# Patient Record
Sex: MALE | HISPANIC OR LATINO | URBAN - METROPOLITAN AREA
[De-identification: names, ages, dates, MRNs, and addresses within clinical notes are randomized per-mention and may not be internally consistent; named-entity substitution may affect disease eponyms.]

---

## 2024-07-08 ENCOUNTER — APPOINTMENT (RX ONLY)
Dept: URBAN - METROPOLITAN AREA CLINIC 34 | Facility: CLINIC | Age: 59
Setting detail: DERMATOLOGY
End: 2024-07-08

## 2024-07-08 DIAGNOSIS — L91.8 OTHER HYPERTROPHIC DISORDERS OF THE SKIN: ICD-10-CM

## 2024-07-08 DIAGNOSIS — L73.8 OTHER SPECIFIED FOLLICULAR DISORDERS: ICD-10-CM

## 2024-07-08 DIAGNOSIS — Z41.9 ENCOUNTER FOR PROCEDURE FOR PURPOSES OTHER THAN REMEDYING HEALTH STATE, UNSPECIFIED: ICD-10-CM

## 2024-07-08 PROCEDURE — ? BENIGN DESTRUCTION COSMETIC MULTI

## 2024-07-08 PROCEDURE — ? PHOTO-DOCUMENTATION

## 2024-07-08 PROCEDURE — ? PRESCRIPTION MEDICATION MANAGEMENT

## 2024-07-08 PROCEDURE — ? BOTOX

## 2024-07-08 PROCEDURE — 99202 OFFICE O/P NEW SF 15 MIN: CPT

## 2024-07-08 PROCEDURE — ? PRESCRIPTION

## 2024-07-08 PROCEDURE — ? COSMETIC CONSULTATION: BOTOX

## 2024-07-08 PROCEDURE — ? COUNSELING

## 2024-07-08 RX ORDER — TRETIONIN 1 MG/G
CREAM TOPICAL
Qty: 20 | Refills: 2 | Status: ERX | COMMUNITY
Start: 2024-07-08

## 2024-07-08 RX ADMIN — TRETIONIN: 1 CREAM TOPICAL at 00:00

## 2024-07-08 ASSESSMENT — LOCATION SIMPLE DESCRIPTION DERM
LOCATION SIMPLE: RIGHT CHEEK
LOCATION SIMPLE: RIGHT ANTERIOR NECK
LOCATION SIMPLE: GLABELLA
LOCATION SIMPLE: LEFT CHEEK
LOCATION SIMPLE: LEFT CHEEK
LOCATION SIMPLE: GLABELLA
LOCATION SIMPLE: RIGHT CHEEK
LOCATION SIMPLE: LEFT ANTERIOR NECK

## 2024-07-08 ASSESSMENT — LOCATION DETAILED DESCRIPTION DERM
LOCATION DETAILED: RIGHT INFERIOR LATERAL NECK
LOCATION DETAILED: LEFT CLAVICULAR NECK
LOCATION DETAILED: RIGHT MEDIAL MALAR CHEEK
LOCATION DETAILED: GLABELLA
LOCATION DETAILED: LEFT CENTRAL MALAR CHEEK
LOCATION DETAILED: RIGHT SUPERIOR ANTERIOR NECK
LOCATION DETAILED: GLABELLA
LOCATION DETAILED: LEFT CENTRAL MALAR CHEEK
LOCATION DETAILED: RIGHT MEDIAL MALAR CHEEK

## 2024-07-08 ASSESSMENT — LOCATION ZONE DERM
LOCATION ZONE: NECK
LOCATION ZONE: FACE
LOCATION ZONE: FACE

## 2024-07-08 NOTE — PROCEDURE: COUNSELING
Detail Level: Detailed
Patient Specific Counseling (Will Not Stick From Patient To Patient): -\\Daniela recommended the patient getting oil free face wash and cleanser.

## 2024-07-08 NOTE — PROCEDURE: PRESCRIPTION MEDICATION MANAGEMENT
Render In Strict Bullet Format?: No
Initiate Treatment: tretinoin 0.1 % topical cream \\nApply to affected areas on the face.
Detail Level: Zone

## 2024-07-08 NOTE — HPI: SKIN LESION (IRRITATED SKIN TAGS)
How Severe Are They?: moderate
Is This A New Presentation, Or A Follow-Up?: Irritated Skin Lesions
Additional History: Pt presents with skin tags on his neck and on his abdomen. He mentions they get caught in his necklace and on his clothes. Due to being bothersome and irritated, he requests them to be removed today.

## 2024-07-08 NOTE — PROCEDURE: BENIGN DESTRUCTION COSMETIC MULTI
Consent: The patient's consent was obtained including but not limited to risks of crusting, scabbing, blistering, scarring, darker or lighter pigmentary change, recurrence, incomplete removal and infection.
Anesthesia Volume In Cc: 0.5
Post-Care Instructions: I reviewed with the patient in detail post-care instructions. Patient is to wear sunprotection, and avoid picking at any of the treated lesions. Pt may apply Vaseline to crusted or scabbing areas.
Price (Use Numbers Only, No Special Characters Or $): 839
Total Number Of Lesions Treated: 5
Detail Level: Zone

## 2024-07-08 NOTE — PROCEDURE: BOTOX
Consent: Written consent obtained. Risks include but not limited to lid/brow ptosis, bruising, swelling, diplopia, temporary effect, incomplete chemical denervation.
Nasal Root Units: 0
Show Lateral Platysmal Band Units: Yes
Post-Care Instructions: Patient instructed to not lie down for 4 hours and limit physical activity for 24 hours.
Show Right And Left Periorbital Units: No
Dilution (U/0.1 Cc): 4
Additional Area 1 Location: glabella
Additional Area 1 Units: 20
Incrementing Botox Units: By 0.5 Units
Lot #: [id number]
Expiration Date (Month Year): 2026/03
Detail Level: Detailed
Glabellar Complex Units: 0.5
Price (Use Numbers Only, No Special Characters Or $): 320
Map Statement: Please see attached map for locations and injection amounts.

## 2024-07-08 NOTE — HPI: BUMPS
How Severe Are Your Bumps?: moderate
Is This A New Presentation, Or A Follow-Up?: Bumps
Additional History: Pt presents with bumps on his face. He denies any itchiness, but they are spreading across his face. Pt has used tretinoin 0.025% from his brother for a few weeks and noticed no changes. He also wants to discuss a skin care routine to make his skin look more clear.

## 2024-07-19 RX ORDER — TRETIONIN 1 MG/G
CREAM TOPICAL
Qty: 20 | Refills: 2 | Status: ERX

## 2024-08-09 ENCOUNTER — APPOINTMENT (RX ONLY)
Dept: URBAN - METROPOLITAN AREA CLINIC 34 | Facility: CLINIC | Age: 59
Setting detail: DERMATOLOGY
End: 2024-08-09

## 2024-08-09 DIAGNOSIS — L82.0 INFLAMED SEBORRHEIC KERATOSIS: ICD-10-CM | Status: RESOLVED

## 2024-08-09 DIAGNOSIS — L73.8 OTHER SPECIFIED FOLLICULAR DISORDERS: ICD-10-CM | Status: INADEQUATELY CONTROLLED

## 2024-08-09 PROCEDURE — ? PRESCRIPTION MEDICATION MANAGEMENT

## 2024-08-09 PROCEDURE — ? PHOTO-DOCUMENTATION

## 2024-08-09 PROCEDURE — ? COUNSELING

## 2024-08-09 PROCEDURE — 17110 DESTRUCTION B9 LES UP TO 14: CPT

## 2024-08-09 PROCEDURE — 99212 OFFICE O/P EST SF 10 MIN: CPT | Mod: 25

## 2024-08-09 PROCEDURE — ? LIQUID NITROGEN

## 2024-08-09 ASSESSMENT — LOCATION ZONE DERM
LOCATION ZONE: FACE
LOCATION ZONE: FACE

## 2024-08-09 ASSESSMENT — LOCATION DETAILED DESCRIPTION DERM
LOCATION DETAILED: RIGHT CENTRAL MALAR CHEEK
LOCATION DETAILED: RIGHT MEDIAL MALAR CHEEK
LOCATION DETAILED: LEFT SUPERIOR FOREHEAD
LOCATION DETAILED: LEFT CENTRAL MALAR CHEEK
LOCATION DETAILED: LEFT CENTRAL MALAR CHEEK
LOCATION DETAILED: RIGHT MEDIAL MALAR CHEEK

## 2024-08-09 ASSESSMENT — LOCATION SIMPLE DESCRIPTION DERM
LOCATION SIMPLE: LEFT CHEEK
LOCATION SIMPLE: LEFT FOREHEAD
LOCATION SIMPLE: LEFT CHEEK
LOCATION SIMPLE: RIGHT CHEEK
LOCATION SIMPLE: RIGHT CHEEK

## 2024-08-09 NOTE — PROCEDURE: LIQUID NITROGEN
Medical Necessity Clause: This procedure was medically necessary because the lesions that were treated were:
Add 52 Modifier (Optional): no
Medical Necessity Information: It is in your best interest to select a reason for this procedure from the list below. All of these items fulfill various CMS LCD requirements except the new and changing color options.
Post-Care Instructions: I reviewed with the patient in detail post-care instructions. Patient is to wear sunprotection, and avoid picking at any of the treated lesions. Pt may apply Vaseline to crusted or scabbing areas.
Show Aperture Variable?: Yes
Spray Paint Text: The liquid nitrogen was applied to the skin utilizing a spray paint frosting technique.
Consent: The patient's consent was obtained including but not limited to risks of crusting, scabbing, blistering, scarring, darker or lighter pigmentary change, recurrence, incomplete removal and infection.
Detail Level: Detailed

## 2024-08-09 NOTE — PROCEDURE: PRESCRIPTION MEDICATION MANAGEMENT
Detail Level: Zone
Continue Regimen: tretinoin 0.1 % topical cream TP\\nSig: Apply to affected areas on the face
Render In Strict Bullet Format?: No

## 2024-11-08 ENCOUNTER — APPOINTMENT (RX ONLY)
Dept: URBAN - METROPOLITAN AREA CLINIC 34 | Facility: CLINIC | Age: 59
Setting detail: DERMATOLOGY
End: 2024-11-08

## 2024-11-08 DIAGNOSIS — L73.8 OTHER SPECIFIED FOLLICULAR DISORDERS: ICD-10-CM | Status: INADEQUATELY CONTROLLED

## 2024-11-08 DIAGNOSIS — Z41.9 ENCOUNTER FOR PROCEDURE FOR PURPOSES OTHER THAN REMEDYING HEALTH STATE, UNSPECIFIED: ICD-10-CM

## 2024-11-08 DIAGNOSIS — L82.0 INFLAMED SEBORRHEIC KERATOSIS: ICD-10-CM | Status: INADEQUATELY CONTROLLED

## 2024-11-08 PROCEDURE — ? PRESCRIPTION MEDICATION MANAGEMENT

## 2024-11-08 PROCEDURE — 99212 OFFICE O/P EST SF 10 MIN: CPT | Mod: 25

## 2024-11-08 PROCEDURE — 17110 DESTRUCTION B9 LES UP TO 14: CPT

## 2024-11-08 PROCEDURE — ? PHOTO-DOCUMENTATION

## 2024-11-08 PROCEDURE — ? COUNSELING

## 2024-11-08 PROCEDURE — ? ADDITIONAL NOTES

## 2024-11-08 PROCEDURE — ? LIQUID NITROGEN

## 2024-11-08 PROCEDURE — ? BOTOX

## 2024-11-08 PROCEDURE — ? COSMETIC CONSULTATION - MICRO-NEEDLING

## 2024-11-08 ASSESSMENT — LOCATION SIMPLE DESCRIPTION DERM
LOCATION SIMPLE: RIGHT EYEBROW
LOCATION SIMPLE: RIGHT FOREHEAD
LOCATION SIMPLE: LEFT CHEEK
LOCATION SIMPLE: LEFT CHEEK
LOCATION SIMPLE: LEFT FOREHEAD
LOCATION SIMPLE: RIGHT CHEEK
LOCATION SIMPLE: LEFT EYEBROW
LOCATION SIMPLE: RIGHT CHEEK

## 2024-11-08 ASSESSMENT — LOCATION ZONE DERM
LOCATION ZONE: FACE
LOCATION ZONE: FACE

## 2024-11-08 ASSESSMENT — LOCATION DETAILED DESCRIPTION DERM
LOCATION DETAILED: LEFT INFERIOR FOREHEAD
LOCATION DETAILED: RIGHT LATERAL EYEBROW
LOCATION DETAILED: LEFT CENTRAL MALAR CHEEK
LOCATION DETAILED: RIGHT MEDIAL MALAR CHEEK
LOCATION DETAILED: LEFT SUPERIOR FOREHEAD
LOCATION DETAILED: RIGHT MEDIAL MALAR CHEEK
LOCATION DETAILED: LEFT INFERIOR CENTRAL MALAR CHEEK
LOCATION DETAILED: LEFT LATERAL EYEBROW
LOCATION DETAILED: RIGHT INFERIOR CENTRAL MALAR CHEEK
LOCATION DETAILED: RIGHT CENTRAL MALAR CHEEK
LOCATION DETAILED: RIGHT INFERIOR FOREHEAD
LOCATION DETAILED: RIGHT INFERIOR MEDIAL FOREHEAD
LOCATION DETAILED: LEFT CENTRAL MALAR CHEEK
LOCATION DETAILED: LEFT INFERIOR MEDIAL FOREHEAD

## 2024-11-08 NOTE — PROCEDURE: BOTOX
Show Right And Left Periorbital Units: No
Show [place] Units: Yes
Right Periorbital Skin Units: 0
Lot #: [id number]
Dilution (U/0.1 Cc): 4
Incrementing Botox Units: By 0.5 Units
Patient Specific Comments (Will Not Stick From Patient To Patient): Treatment of brow lift 6 units each brow
Expiration Date (Month Year): 2026/09
Price (Use Numbers Only, No Special Characters Or $): 180
Detail Level: Detailed
Consent: Written consent obtained. Risks include but not limited to lid/brow ptosis, bruising, swelling, diplopia, temporary effect, incomplete chemical denervation.
Post-Care Instructions: Patient instructed to not lie down for 4 hours and limit physical activity for 24 hours.

## 2024-11-08 NOTE — PROCEDURE: ADDITIONAL NOTES
Render Risk Assessment In Note?: no
Detail Level: Simple
Additional Notes: EAT discussed with pt that micro needling is $400 per treatment.